# Patient Record
Sex: FEMALE | Race: WHITE | NOT HISPANIC OR LATINO | Employment: UNEMPLOYED | URBAN - NONMETROPOLITAN AREA
[De-identification: names, ages, dates, MRNs, and addresses within clinical notes are randomized per-mention and may not be internally consistent; named-entity substitution may affect disease eponyms.]

---

## 2018-03-27 ENCOUNTER — TELEPHONE (OUTPATIENT)
Dept: ORTHOPEDIC SURGERY | Facility: CLINIC | Age: 23
End: 2018-03-27

## 2018-03-27 ENCOUNTER — OFFICE VISIT (OUTPATIENT)
Dept: ORTHOPEDIC SURGERY | Facility: CLINIC | Age: 23
End: 2018-03-27

## 2018-03-27 VITALS
DIASTOLIC BLOOD PRESSURE: 70 MMHG | WEIGHT: 220 LBS | HEART RATE: 64 BPM | BODY MASS INDEX: 34.53 KG/M2 | SYSTOLIC BLOOD PRESSURE: 119 MMHG | HEIGHT: 67 IN

## 2018-03-27 DIAGNOSIS — S52.134A CLOSED NONDISPLACED FRACTURE OF NECK OF RIGHT RADIUS, INITIAL ENCOUNTER: Primary | ICD-10-CM

## 2018-03-27 PROCEDURE — 24650 CLTX RDL HEAD/NCK FX WO MNPJ: CPT | Performed by: PHYSICIAN ASSISTANT

## 2018-03-27 NOTE — TELEPHONE ENCOUNTER
Patient's mother called stated, patient is having a lot of pain, needs something for pain.  Advised her she was not on her HIPAA form, patient will need to call back to request pain medications.  We will have provider in office in the morning.    Mother verbalized understanding.

## 2018-03-27 NOTE — PROGRESS NOTES
New Patient Visit        Patient: Ceci Mosquera  YOB: 1995  Date of encounter: 3/27/2018      History of Present Illness:   Ceci Mosquera is a 22 y.o. female who is referred here today by [facility] for evaluation of acute injury to her right elbow.  She states on March 25, 2018 she fell at home.  She states she slipped and fell and tried to catch herself and landed directly on the right elbow.  She is complaining of pain along the lateral aspect of the elbow.  She states it's worse when she rotates her wrist.  She states the pain will radiate down into her forearm.  She denies paresthesias.    PMH:   There is no problem list on file for this patient.    Past Medical History:   Diagnosis Date   • Forearm fracture     Right       PSH:  Past Surgical History:   Procedure Laterality Date   • CHOLECYSTECTOMY         Allergies:   No Known Allergies    Medications:   No current outpatient prescriptions on file.    Social History:  Social History     Social History   • Marital status:      Spouse name: N/A   • Number of children: N/A   • Years of education: N/A     Occupational History   • Not on file.     Social History Main Topics   • Smoking status: Current Some Day Smoker   • Smokeless tobacco: Never Used   • Alcohol use Yes      Comment: Occasional   • Drug use: No   • Sexual activity: Not on file     Other Topics Concern   • Not on file     Social History Narrative   • No narrative on file       Family History:     Family History   Problem Relation Age of Onset   • Diabetes Maternal Grandmother    • Diabetes Maternal Grandfather        Review of Systems:   Review of Systems   Constitutional: Negative.    HENT: Negative.    Eyes: Negative.    Respiratory: Negative.    Cardiovascular: Negative.    Gastrointestinal: Negative.    Endocrine: Negative.    Genitourinary: Negative.    Musculoskeletal:        Pertinent positives mentioned in HPI   Skin: Negative.    Neurological: Negative.   "  Hematological: Negative.    Psychiatric/Behavioral: Negative.        Physical Exam: 22 y.o. female  General Appearance:    Alert and oriented x 3, cooperative, in no acute distress                   Vitals:    03/27/18 1344   BP: 119/70   Pulse: 64   Weight: 99.8 kg (220 lb)   Height: 170.2 cm (67\")              Body mass index is 34.46 kg/m².        Musculoskeletal: Examination of the right elbow reveals moderate swelling with ecchymosis.  She has tenderness along the radial head.  She has limited motion and pain with pronation and supination.  There is no gross instability.  Her neurovascular status is intact.    Radiology:     2 views of the right forearm reveal nondisplaced fracture through the radial neck.    Assessment    ICD-10-CM ICD-9-CM   1. Closed nondisplaced fracture of neck of right radius, initial encounter S52.134A 813.06       Plan:   A 22-year-old female with an acute injury to her right elbow.  X-rays were reviewed today revealing a nondisplaced radial neck fracture.  We will treat conservatively with immobilization.  Today she was placed in a long arm fiberglass splint.  This is nonremovable.  She will return back in 2 weeks for repeat x-rays out of splint.    Vijaya IYER              Discussed the patient's BMI with her. BMI is above normal parameters. Follow-up plan includes:  educational material.  "

## 2018-04-06 DIAGNOSIS — S52.134D CLOSED NONDISPLACED FRACTURE OF NECK OF RIGHT RADIUS WITH ROUTINE HEALING, SUBSEQUENT ENCOUNTER: Primary | ICD-10-CM

## 2018-04-10 ENCOUNTER — HOSPITAL ENCOUNTER (OUTPATIENT)
Dept: GENERAL RADIOLOGY | Facility: HOSPITAL | Age: 23
Discharge: HOME | End: 2018-04-10
Admitting: PHYSICIAN ASSISTANT

## 2018-04-10 ENCOUNTER — OFFICE VISIT (OUTPATIENT)
Dept: ORTHOPEDIC SURGERY | Facility: CLINIC | Age: 23
End: 2018-04-10

## 2018-04-10 VITALS — HEIGHT: 67 IN | BODY MASS INDEX: 34.53 KG/M2 | WEIGHT: 220 LBS

## 2018-04-10 DIAGNOSIS — S52.134D CLOSED NONDISPLACED FRACTURE OF NECK OF RIGHT RADIUS WITH ROUTINE HEALING, SUBSEQUENT ENCOUNTER: ICD-10-CM

## 2018-04-10 DIAGNOSIS — S52.134D CLOSED NONDISPLACED FRACTURE OF NECK OF RIGHT RADIUS WITH ROUTINE HEALING, SUBSEQUENT ENCOUNTER: Primary | ICD-10-CM

## 2018-04-10 PROCEDURE — 99024 POSTOP FOLLOW-UP VISIT: CPT | Performed by: PHYSICIAN ASSISTANT

## 2018-04-10 PROCEDURE — 73080 X-RAY EXAM OF ELBOW: CPT | Performed by: RADIOLOGY

## 2018-04-10 PROCEDURE — 73080 X-RAY EXAM OF ELBOW: CPT

## 2018-04-10 RX ORDER — NORELGESTROMIN AND ETHINYL ESTRADIOL 150; 35 UG/D; UG/D
PATCH TRANSDERMAL
COMMUNITY
Start: 2018-03-11 | End: 2019-07-24

## 2018-04-10 RX ORDER — IBUPROFEN 800 MG/1
TABLET ORAL
COMMUNITY
Start: 2018-03-25 | End: 2019-07-24

## 2018-04-10 NOTE — PROGRESS NOTES
"Ceci Mosquera   :1995    Date of encounter:04/10/2018        HPI:  Ceci Msoquera is a 22 y.o. female who returns here today for follow-up of a right radial neck fracture.  She is now 2 weeks post injury.  She's been wearing the posterior splint full-time.  She states pain and swelling have improved tremendously.  She denies paresthesias.    PMH:   There is no problem list on file for this patient.      Exam:  General Appearance:    22 y.o. female  cooperative, in no acute distress.  Alert and oriented x 3,                   Vitals:    04/10/18 1124   Weight: 99.8 kg (220 lb)   Height: 170.2 cm (67\")          Body mass index is 34.46 kg/m².   Examination of the right elbow reveals improved swelling.  She still is mild tenderness along the radial head and neck.  She has full range of motion with supination, pronation, flexion and extension.  There is no gross instability.  Her neurovascular status is intact.    Radiology:   3 views of the right elbow were reviewed revealing a nondisplaced fracture through the radial neck.  There is no change in alignment and evidence of healing.    Assessment    ICD-10-CM ICD-9-CM   1. Closed nondisplaced fracture of neck of right radius with routine healing, subsequent encounter S52.134D V54.12       Plan:   A 22-year-old female with a nondisplaced right radial neck fracture.  X-rays today reveal no change in alignment with evidence of healing.  She's doing well with improvement in her pain.  It have provided her a sling to wear with activities for the next 2 weeks and then she can begin to wean out of it.  I have advised that she can begin gentle range of motion exercises and we'll have her return back for repeat x-rays and evaluation in 4 weeks.    Vijaya IYER                  "

## 2018-05-07 DIAGNOSIS — S52.134D CLOSED NONDISPLACED FRACTURE OF NECK OF RIGHT RADIUS WITH ROUTINE HEALING, SUBSEQUENT ENCOUNTER: Primary | ICD-10-CM

## 2018-05-08 ENCOUNTER — HOSPITAL ENCOUNTER (OUTPATIENT)
Dept: GENERAL RADIOLOGY | Facility: HOSPITAL | Age: 23
End: 2018-05-08

## 2019-07-24 ENCOUNTER — OFFICE VISIT (OUTPATIENT)
Dept: PSYCHIATRY | Facility: CLINIC | Age: 24
End: 2019-07-24

## 2019-07-24 DIAGNOSIS — F33.9 DEPRESSION, RECURRENT (HCC): ICD-10-CM

## 2019-07-24 DIAGNOSIS — O99.343 DEPRESSION DURING PREGNANCY IN THIRD TRIMESTER: ICD-10-CM

## 2019-07-24 DIAGNOSIS — F32.A DEPRESSION DURING PREGNANCY IN THIRD TRIMESTER: ICD-10-CM

## 2019-07-24 PROCEDURE — 90791 PSYCH DIAGNOSTIC EVALUATION: CPT | Performed by: COUNSELOR

## 2019-07-24 NOTE — PROGRESS NOTES
Patient ID: Ceci Mosquera is a 24 y.o. female presenting to [facility] for assessment with PATRICIO Loco, NCC.     Time: 3:09pm   Name of PCP: Dmitry Pickett  Referral source: self   Description of current emotional/behavioral concerns: Pt states that she has always struggled with depression since she was preteen. She discusses her childhood growing up with her father in and out of [employer] while her mother frequently stayed under the influence.  Patient states that she and her mother often lived with her grandparents who tended to take care of her.  The patient has a positive relationship with both of her parents she does not consider it as part of her support system.  Patient advises that she is recently  from her .  He recently moved out, purchased a house, and does not foresee them reconciling their marriage.  Patient still hopes to reunite with her spouse though she is aware that he is currently involved in other relationships with other women.  He denies his current marriage or father who would to the new women and has expectations for patient to make changes.  Patient is willing to make changes to improve herself as a person and as a mother.  She did not will work on improving self identity, depression and motherhood skills. Pt is 30 weeks pregnant and chooses to work on stabilizing her depression in order to improve her progress as a mother with a new infant as a single parent.      Patient adamantly and convincingly denies current suicidal or homicidal ideation or perceptual disturbance.    Significant Life Events    Has patient been through or witnessed a divorce? No, but recently      Has patient experienced a loss of relationship? no    Has patient experienced a major accident or tragic events? no    Has patient experienced any other significant life events or trauma? no    Work History    Highest level of education obtained: [employer]    Patient's Occupation: retail    Describe patient's current and past work experience: retail      Legal History    The patient has no significant history of legal issues.    Interpersonal/Relational    Marital Status:   Patient's current living situation: lives alone  Support system: extended family  Difficulty getting along with peers: no  Difficulty making new friendships: no  Maintaining friendships: yes  Close with family members: yes    Mental/Behavioral Health History    History of prior treatment or hospitalization: Pt attended 1 session with a therapist when she was 16 years old for depression.     Are there any significant health issues (current or past): current pregnancy    History of seizures: no    Family History   Problem Relation Age of Onset   • Diabetes Maternal Grandfather    • Diabetes Paternal Grandmother        Current Medications:   No current outpatient medications on file.     No current facility-administered medications for this visit.        History of Substance Use:     Patient answered no  to experiencing two or more of the following problems related to substance use: using more than intended or over longer period than intended; difficulty quitting or cutting back use; spending a great deal of time obtaining, using, or recovering from using; craving or strong desire or urge to use;  work and/or school problems; financial problems; family problems; using in dangerous situations; physical or mental health problems; relapse; feelings of guilt or remorse about use; times when used and/or drank alone; needing to use more in order to achieve the desired effect; illness or withdrawal when stopping or cutting back use; using to relieve or avoid getting ill or developing withdrawal symptoms; and black outs and/or memory issues when using.        Substance Age Frequency Amount Method Last use   Nicotine 15 daily 1 pack a day smoking 4 months ago    Alcohol 14 Every other day 1  drink drinking 2 years ago    Marijuana 15 Daily 1 joint smoking 1 year ago   Benzo never       Pain Pills never       Cocaine never       Meth never       Heroin never       Suboxone never       Synthetics/Other:   never           PHQ-Score Total:  PHQ-9 Total Score: 18    SUICIDE RISK ASSESSMENT/CSSRS    1. Does patient have thoughts of suicide? no  2. Does patient have intent for suicide? no  3. Does patient have a current plan for suicide? no  4. History of suicide attempts: no  5. Family history of suicide or attempts: no  6. History of violent behaviors towards others or property or thoughts of committing suicide: no  7. History of sexual aggression toward others: no  8. Access to firearms or weapons: no    Mental Status Exam:   Hygiene:   fair  Cooperation:  Cooperative  Eye Contact:  Good  Psychomotor Behavior:  Appropriate  Affect:  Appropriate  Mood: normal  Hopelessness: 3  Speech:  Normal  Thought Process:  Goal directed  Thought Content:  Normal  Suicidal:  None  Homicidal:  None  Hallucinations:  None  Delusion:  None  Memory:  Intact  Orientation:  Person, Place and Time  Reliability:  fair  Insight:  Good  Judgement:  Good  Impulse Control:  Fair      Crisis Plan:  Symptoms and/or behaviors to indicate a crisis: Feeling sad or low    What calming techniques or other strategies will patient use to de-esclate and stay safe: slow down, breathe, visualize calming self, think it though, listen to music, change focus, take a walk    Who is one person patient can contact to assist with de-escalation? Friend     If symptoms/behaviors persist, patient will present to the nearest hospital for an assessment. Advised patient of [facility] 24/7 assessment services.     VISIT DIAGNOSIS:     ICD-10-CM ICD-9-CM   1. Depression, recurrent (CMS/[facility]) F33.9 296.30   2. Depression during pregnancy in third trimester O99.343 648.43    F32.9         Plan:   Obtain release of information for current treatment  team for continuity of care  Patient will adhere to medication regimen as prescribed and report any side effects. Patient will contact this office, call 911 or present to the nearest emergency room should suicidal or homicidal ideations occur.  Begin psychotherapy    Recommended Referrals: none       This document has been electronically signed by PATRICIO Loco  July 24, 2019 3:27 PM

## 2019-08-08 ENCOUNTER — OFFICE VISIT (OUTPATIENT)
Dept: PSYCHIATRY | Facility: CLINIC | Age: 24
End: 2019-08-08

## 2019-08-08 DIAGNOSIS — F41.1 GENERALIZED ANXIETY DISORDER: ICD-10-CM

## 2019-08-08 DIAGNOSIS — F33.1 MAJOR DEPRESSIVE DISORDER, RECURRENT EPISODE, MODERATE (HCC): Primary | ICD-10-CM

## 2019-08-08 PROCEDURE — 90834 PSYTX W PT 45 MINUTES: CPT | Performed by: COUNSELOR
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
## 2019-08-19 ENCOUNTER — OFFICE VISIT (OUTPATIENT)
Dept: PSYCHIATRY | Facility: CLINIC | Age: 24
End: 2019-08-19

## 2019-08-19 DIAGNOSIS — Z63.9 RELATIONSHIP PROBLEMS: ICD-10-CM

## 2019-08-19 DIAGNOSIS — F33.1 MAJOR DEPRESSIVE DISORDER, RECURRENT EPISODE, MODERATE (HCC): Primary | ICD-10-CM

## 2019-08-19 DIAGNOSIS — F41.1 GENERALIZED ANXIETY DISORDER: ICD-10-CM

## 2019-08-19 PROCEDURE — 90834 PSYTX W PT 45 MINUTES: CPT | Performed by: COUNSELOR

## 2019-08-19 SDOH — SOCIAL STABILITY - SOCIAL INSECURITY: PROBLEM RELATED TO PRIMARY SUPPORT GROUP, UNSPECIFIED: Z63.9

## 2019-08-19 NOTE — PROGRESS NOTES
Date: August 19, 2019  Time In: 8:07am  Time Out: 8:53am      PROGRESS NOTE  Data:  Ceci Mosquera is a 24 y.o. female who presents today for individual therapy session at [facility].  Patient discusses reduced depression and anxiety this visit compared to normal.  Patient states that she has been working a lot more at both jobs and effort to save money to be off during her maternity leave.  Patient states that she has been so busy lately that she has not slowed down enough to focus on her depression and anxiety.  Patient discusses attempting to have a conversation with her partner regarding expected involvement in the relationship and or as a coparent for their child which is due to be born at the end of September.  Patient states that he did not want to be part of the conversation and refused to discuss things with her.    Patient discusses her role in the relationship for over  she states that he states he does not want to be in relationship with her because she nags a lot and is difficult to be with.  Patient denies this, but admits when he goes out with peers she takes them constantly and feels as if he does not respect her.      Clinical Maneuvering/Intervention:    (Scales based on 0 - 10 with 10 being the worst)  Depression: 3 Anxiety: 4       Assisted patient in processing above session content; acknowledged and normalized patient’s thoughts, feelings, and concerns.  Discussed patient attempting different communication with her partner.  Discussed patient's asking him occasional questions when appropriate regarding whether or not he wants to be there when she has the baby, whether or not he wants to come stay a night or so when she has the baby, etc. without having any serious intense for conversation.  Also discussed with patient for long-term communication skills related to times that she was not happy with his behaviors such as going out with his friends in which she changed  herself questioning rather than texting and calling him every few minutes when he is out.    Discussed with patient expectations for postpartum depression as her delivery approaches in the next 6 weeks.  Discussed fatigue, hormonal issues and possible depression considering depression and anxiety already exists and has been an ongoing issue since the age of 15.    Allowed patient to freely discuss issues without interruption or judgment. Provided safe, confidential environment to facilitate the development of positive therapeutic relationship and encourage open, honest communication. Assisted patient in identifying risk factors which would indicate the need for higher level of care including thoughts to harm self or others and/or self-harming behavior and encouraged patient to contact this office, call 911, or present to the nearest emergency room should any of these events occur. Discussed crisis intervention services and means to access. Patient adamantly and convincingly denies current suicidal or homicidal ideation or perceptual disturbance.    Assessment   Patient appears to maintain relative stability as compared to their baseline.  However, patient continues to struggle with depression and anxiety which continues to cause impairment in important areas of functioning.  A result, they can be reasonably expected to continue to benefit from treatment and would likely be at increased risk for decompensation otherwise.    Mental Status Exam:   Hygiene:   fair  Cooperation:  Cooperative  Eye Contact:  Good  Psychomotor Behavior:  Appropriate  Affect:  Appropriate  Mood: normal  Hopelessness: 3  Speech:  Normal  Thought Process:  Goal directed  Thought Content:  Normal  Suicidal:  None  Homicidal:  None  Hallucinations:  None  Delusion:  None  Memory:  Intact  Orientation:  Person, Place and Time  Reliability:  good  Insight:  Fair  Judgement:  Fair  Impulse Control:  Good  Physical/Medical Issues:  Yes pregnancy.      Patient's Support Network Includes:  parents    Functional Status: Moderate impairment     Progress toward goal: Not at goal         Plan     Patient will continue in individual outpatient therapy with focus on improved functioning and coping skills, maintaining stability, and avoiding decompensation and the need for higher level of care.    Patient will adhere to medication regimen as prescribed and report any side effects. Patient will contact this office, call 911 or present to the nearest emergency room should suicidal or homicidal ideations occur. Provide Cognitive Behavioral Therapy and Solution Focused Therapy to improve functioning, maintain stability, and avoid decompensation and the need for higher level of care.     Return in about 2 weeks, or earlier if symptoms worsen or fail to improve.           VISIT DIAGNOSIS:     ICD-10-CM ICD-9-CM   1. Major depressive disorder, recurrent episode, moderate (CMS/HCC) F33.1 296.32   2. Generalized anxiety disorder F41.1 300.02   3. Relationship problems Z63.9 313.3          This document has been electronically signed by PATRICIO Loco  August 19, 2019 9:05 AM        Please note that portions of this note were completed with a voice recognition program. Efforts were made to edit dictation, but occasionally words are mistranscribed.

## 2019-08-19 NOTE — TREATMENT PLAN
Multi-Disciplinary Problems (from Behavioral Health Treatment Plan)    Active Problems     Problem: Anxiety  Start Date: 08/19/19    Problem Details:  The patient self-scales this problem as a 9 with 10 being the worst.       Goal Priority Start Date Expected End Date End Date    Patient will develop and implement behavioral and cognitive strategies to reduce anxiety and irrational fears. -- 08/19/19 -- --    Goal Details:  Progress toward goal:  Not appropriate to rate progress toward goal since this is the initial treatment plan.       Goal Intervention Frequency Start Date End Date    Help patient explore past emotional issues in relation to present anxiety. Q2 Weeks 08/19/19 --    Intervention Details:  Duration of treatment until until remission of symptoms.       Goal Intervention Frequency Start Date End Date    Help patient develop an awareness of their cognitive and physical responses to anxiety. Q2 Weeks 08/19/19 --    Intervention Details:  Duration of treatment until until remission of symptoms.             Problem: Depression  Start Date: 08/19/19    Problem Details:  The patient self-scales this problem as a 8 with 10 being the worst.       Goal Priority Start Date Expected End Date End Date    Patient will demonstrate the ability to initiate new constructive life skills outside of sessions on a consistent basis. -- 08/19/19 -- --    Goal Details:  Progress toward goal:  Not appropriate to rate progress toward goal since this is the initial treatment plan.       Goal Intervention Frequency Start Date End Date    Assist patient in setting attainable activities of daily living goals. PRN 08/19/19 --    Goal Intervention Frequency Start Date End Date    Provide education about depression Q2 Weeks 08/19/19 --    Intervention Details:  Duration of treatment until until remission of symptoms.       Goal Intervention Frequency Start Date End Date    Assist patient in developing healthy coping strategies. Q2  Weeks 08/19/19 --    Intervention Details:  Duration of treatment until until remission of symptoms.             Problem: Relationship Issues  Start Date: 08/19/19    Problem Details:  The patient self-scales this problem as a 7 with 10 being the worst.       Goal Priority Start Date Expected End Date End Date    Patient will initiate personal change to improve relationships. -- 08/19/19 -- --    Goal Details:  Progress toward goal:  Not appropriate to rate progress toward goal since this is the initial treatment plan.       Goal Intervention Frequency Start Date End Date    Assist patient in identifying behaviors that focus on relationship building and process the changes needed to improve relationships. Q2 Weeks 08/19/19 --    Intervention Details:  Duration of treatment until until remission of symptoms.                          I have discussed and reviewed this treatment plan with the patient.  It has been printed for signatures.

## 2022-07-28 ENCOUNTER — HOSPITAL ENCOUNTER (EMERGENCY)
Facility: HOSPITAL | Age: 27
Discharge: LEFT BEFORE DISCHARGE | End: 2022-07-29
Admitting: STUDENT IN AN ORGANIZED HEALTH CARE EDUCATION/TRAINING PROGRAM

## 2022-07-28 VITALS
DIASTOLIC BLOOD PRESSURE: 78 MMHG | OXYGEN SATURATION: 99 % | SYSTOLIC BLOOD PRESSURE: 145 MMHG | HEART RATE: 83 BPM | HEIGHT: 66 IN | TEMPERATURE: 98.5 F | RESPIRATION RATE: 18 BRPM | WEIGHT: 240 LBS | BODY MASS INDEX: 38.57 KG/M2

## 2022-07-28 PROCEDURE — 99281 EMR DPT VST MAYX REQ PHY/QHP: CPT
